# Patient Record
Sex: FEMALE | Race: WHITE | Employment: UNEMPLOYED | URBAN - METROPOLITAN AREA
[De-identification: names, ages, dates, MRNs, and addresses within clinical notes are randomized per-mention and may not be internally consistent; named-entity substitution may affect disease eponyms.]

---

## 2022-10-23 ENCOUNTER — HOSPITAL ENCOUNTER (EMERGENCY)
Age: 72
Discharge: HOME | End: 2022-10-23
Attending: EMERGENCY MEDICINE
Payer: MEDICARE

## 2022-10-23 VITALS
BODY MASS INDEX: 21.49 KG/M2 | RESPIRATION RATE: 14 BRPM | HEART RATE: 88 BPM | HEIGHT: 65 IN | SYSTOLIC BLOOD PRESSURE: 178 MMHG | OXYGEN SATURATION: 97 % | WEIGHT: 129 LBS | DIASTOLIC BLOOD PRESSURE: 87 MMHG | TEMPERATURE: 98.1 F

## 2022-10-23 DIAGNOSIS — S39.012A STRAIN OF LUMBAR REGION, INITIAL ENCOUNTER: Primary | ICD-10-CM

## 2022-10-23 PROCEDURE — 99283 EMERGENCY DEPT VISIT LOW MDM: CPT

## 2022-10-23 RX ORDER — DULOXETIN HYDROCHLORIDE 30 MG/1
30 CAPSULE, DELAYED RELEASE ORAL 2 TIMES DAILY
COMMUNITY
Start: 2022-05-25

## 2022-10-23 RX ORDER — AMLODIPINE BESYLATE 5 MG/1
5 TABLET ORAL DAILY
COMMUNITY
Start: 2022-07-26

## 2022-10-23 RX ORDER — OXYCODONE HYDROCHLORIDE 5 MG/1
5 TABLET ORAL 2 TIMES DAILY PRN
COMMUNITY
Start: 2022-09-28

## 2022-10-23 RX ORDER — METHOCARBAMOL 500 MG/1
500 TABLET, FILM COATED ORAL 4 TIMES DAILY
Qty: 40 TABLET | Refills: 0 | Status: SHIPPED | OUTPATIENT
Start: 2022-10-23 | End: 2022-11-02

## 2022-10-23 NOTE — ED PROVIDER NOTES
CHIEF COMPLAINT  Back Pain (Patient states that last Friday she was laying down on her side and coughed and that's when the pain started. She states that since then it has moved around her back, sometimes reaches to the front and goes her epigastric region. She reports she went to Urgent Care and they told her she didn't have a pneumonia, but she might have bronchitis. She was prescribed a z-pack, she has 2 days left. She reports pain is worse with movement and coughing, denies \"pain with breathing. \")      HISTORY OF PRESENT ILLNESS  Anton Woods is a 67 y.o. female with a history of chronic low back pain, hypertension, COPD, tobacco use presenting for evaluation of back pain. Patient states that the back pain started last Friday when she was laying on her side and coughed. After she coughed, she feels that she pulled a back muscle. She states that there has been pain to the bilateral back which is worse when she twists or moves or bends over. No new weakness, numbness in the lower extremities. She did have a nonproductive cough at that time had gone to urgent care, she was prescribed a Z-Kimo for potential bronchitis. She states that she takes oxycodone for chronic low back pain. She states that the oxycodone has not been helping with the pain. No burning or pain with urination no fevers. No other complaints, modifying factors or associated symptoms. I have reviewed the following from the nursing documentation. Past Medical History:   Diagnosis Date    Acid reflux     Asthma     Back pain     COPD (chronic obstructive pulmonary disease) (HCC)     Emphysema lung (HCC)     Hyperlipidemia     Hypertension      Past Surgical History:   Procedure Laterality Date    BLADDER REPAIR      CORONARY ANGIOPLASTY WITH STENT PLACEMENT      2    HYSTERECTOMY (CERVIX STATUS UNKNOWN)      MANDIBLE SURGERY      VEIN SURGERY       History reviewed. No pertinent family history.   Social History     Socioeconomic History Marital status:      Spouse name: Not on file    Number of children: Not on file    Years of education: Not on file    Highest education level: Not on file   Occupational History    Not on file   Tobacco Use    Smoking status: Every Day     Packs/day: 1.00     Types: Cigarettes    Smokeless tobacco: Not on file   Substance and Sexual Activity    Alcohol use: Yes     Comment: rare    Drug use: No    Sexual activity: Not on file   Other Topics Concern    Not on file   Social History Narrative    Not on file     Social Determinants of Health     Financial Resource Strain: Not on file   Food Insecurity: Not on file   Transportation Needs: Not on file   Physical Activity: Not on file   Stress: Not on file   Social Connections: Not on file   Intimate Partner Violence: Not on file   Housing Stability: Not on file     No current facility-administered medications for this encounter. Current Outpatient Medications   Medication Sig Dispense Refill    DULoxetine (CYMBALTA) 30 MG extended release capsule Take 30 mg by mouth 2 times daily      oxyCODONE (ROXICODONE) 5 MG immediate release tablet Take 5 mg by mouth 2 times daily as needed. 2 tablets twice per day as needed for pain      methocarbamol (ROBAXIN) 500 MG tablet Take 1 tablet by mouth 4 times daily for 10 days 40 tablet 0    amLODIPine (NORVASC) 5 MG tablet Take 5 mg by mouth daily      albuterol (PROVENTIL) (2.5 MG/3ML) 0.083% nebulizer solution Take 3 mLs by nebulization every 6 hours as needed for Wheezing 120 each 3    aspirin 81 MG tablet Take 81 mg by mouth daily      VITAMIN D, CHOLECALCIFEROL, PO Take by mouth      Famotidine (PEPCID PO) Take by mouth      Albuterol Sulfate (PROVENTIL HFA IN) Inhale into the lungs      lidocaine (LIDODERM) 5 % Place 1 patch onto the skin daily 12 hours on, 12 hours off.       Atorvastatin Calcium (LIPITOR PO) Take by mouth       Allergies   Allergen Reactions    Codeine Itching    Naproxen Itching    Valium [Diazepam] Itching       REVIEW OF SYSTEMS  Positive and pertinent negatives as per HPI. All other systems were reviewed and are negative. PHYSICAL EXAM  BP (!) 178/87   Pulse 88   Temp 98.1 °F (36.7 °C) (Oral)   Resp 14   Ht 5' 4.5\" (1.638 m)   Wt 129 lb (58.5 kg)   SpO2 97%   BMI 21.80 kg/m²   GENERAL APPEARANCE: Awake and alert. Cooperative. HEAD: Normocephalic. Atraumatic. HEART: RRR. No harsh murmurs. Intact radial pulses 2+ bilaterally. LUNGS: Respirations unlabored without accessory muscle use. Speaking comfortably in full sentences. EXTREMITIES: No peripheral edema. No acute deformities. There is reproducible bilateral paraspinal lumbar and thoracic muscle tenderness to palpation no overlying skin changes no midline thoracic or lumbar tenderness  SKIN: Warm and dry. No acute rashes. NEUROLOGICAL: Alert and oriented X 3. No focal deficit    LABS  I have reviewed all labs for this visit. No results found for this visit on 10/23/22. RADIOLOGY  X-RAYS:  I have reviewed radiologic plain film image(s). ALL OTHER NON-PLAIN FILM IMAGES SUCH AS CT, ULTRASOUND AND MRI HAVE BEEN READ BY THE RADIOLOGIST. No orders to display          No results found. During the patient's ED course, the patient was given:  Medications - No data to display     ED COURSE/MDM  Patient seen and evaluated. 76-year-old female presenting for musculoskeletal back pain that started after a coughing episode about a week ago she states the cough has mostly gone away at this point in time. Pain is very reproducible on exam she has no new neurological symptoms no midline spinal tenderness no history of trauma. As it is reproducible on exam suspect muscle spasm or muscle strain. Advised that she can continue with her home medication but also may try Robaxin 500 mg for suspected muscle spasm or strain. The patient will be discharged from the emergency department.  The patient was counseled on their diagnosis and any medications prescribed. They were advised on the need for PCP followup. They were counseled on the need to return to the emergency department if any of their symptoms were to worsen, change or have any other concerns. Discharged in stable condition. Patient was given scripts for the following medications. I counseled patient how to take these medications. New Prescriptions    METHOCARBAMOL (ROBAXIN) 500 MG TABLET    Take 1 tablet by mouth 4 times daily for 10 days       CLINICAL IMPRESSION  1. Strain of lumbar region, initial encounter        Blood pressure (!) 178/87, pulse 88, temperature 98.1 °F (36.7 °C), temperature source Oral, resp. rate 14, height 5' 4.5\" (1.638 m), weight 129 lb (58.5 kg), SpO2 97 %. DISPOSITION  Lorraine Jiménez was discharged to home in stable condition. This chart was generated in part by using Dragon Dictation system and may contain errors related to that system including errors in grammar, punctuation, and spelling, as well as words and phrases that may be inappropriate. If there are any questions or concerns please feel free to contact the dictating provider for clarification.        Brian Brasher MD  10/23/22 [id number]

## 2022-10-23 NOTE — ED NOTES
Patient to ER with acute exacerbation of chronic back pain that started last Friday after coughing. She states she thinks she might've pulled something. Was seen at Urgent Care, diagnosed with bronchitis and given z-pack (has 2 days left). She reports pain worse with coughing and movement. She states pain started off in the right side, moved around to her ribs and epigastric region but currently is in the middle of her back. She states that she is having productive cough but that's the only other issue currently. She denies urinary symptoms such as burning, pain, itching because she was asked at Urgent Care about these symptoms. Patient also states she took a 5mg Oxycodone at home but it \"hasn't touched this pain\", she also admits to \"being a lightweight with medication\".  Patient tells RN that she did drive self to ER this AM.      Zurdo Starks RN  10/23/22 [id number]

## 2022-10-23 NOTE — DISCHARGE INSTRUCTIONS
You likely have a muscle spasm in your back or pulled muscle. Please take your home medications but also Robaxin as needed up to 4 times daily to help with your pain and symptoms.   Follow-up with your primary care doctor